# Patient Record
Sex: FEMALE | Race: WHITE | URBAN - METROPOLITAN AREA
[De-identification: names, ages, dates, MRNs, and addresses within clinical notes are randomized per-mention and may not be internally consistent; named-entity substitution may affect disease eponyms.]

---

## 2020-10-06 ENCOUNTER — OFFICE VISIT (OUTPATIENT)
Dept: ORTHOPEDIC SURGERY | Age: 55
End: 2020-10-06
Payer: COMMERCIAL

## 2020-10-06 VITALS — WEIGHT: 215 LBS | HEIGHT: 63 IN | BODY MASS INDEX: 38.09 KG/M2

## 2020-10-06 PROCEDURE — 99243 OFF/OP CNSLTJ NEW/EST LOW 30: CPT | Performed by: ORTHOPAEDIC SURGERY

## 2020-10-06 RX ORDER — MELOXICAM 7.5 MG/1
15 TABLET ORAL DAILY
Qty: 30 TABLET | Refills: 3 | Status: SHIPPED | OUTPATIENT
Start: 2020-10-06

## 2020-10-07 NOTE — PROGRESS NOTES
KNEE VISIT      HISTORY OF PRESENT ILLNESS    Catracho Callejas is a 63 y.o. female who presents for consultation at request of Dr. Lakeisha Gillespie, for bilateral left worse than right knee pain that she has had for the last 2 months or so. She says she is complained of constant pain graded 5/10 that is accompanied by stiffness swelling and instability. Stairs and long periods of being on her feet moving around way seems aggravated. Rest seem to help at times. She did have physical therapy formally as well as medication and a cortisone injection into the left knee which did not help. Despite that she persistent having pain. She denies any history of injury. ROS    Well-documented patient history form dated 10/6/2020  All other ROS negative except for above. Past Surgical history    History reviewed. No pertinent surgical history. PAST MEDICAL    History reviewed. No pertinent past medical history. Allergies    Allergies not on file    Meds    Current Outpatient Medications   Medication Sig Dispense Refill    meloxicam (MOBIC) 7.5 MG tablet Take 2 tablets by mouth daily 30 tablet 3     No current facility-administered medications for this visit.         Social    Social History     Socioeconomic History    Marital status: Single     Spouse name: Not on file    Number of children: Not on file    Years of education: Not on file    Highest education level: Not on file   Occupational History    Not on file   Social Needs    Financial resource strain: Not on file    Food insecurity     Worry: Not on file     Inability: Not on file    Transportation needs     Medical: Not on file     Non-medical: Not on file   Tobacco Use    Smoking status: Never Smoker    Smokeless tobacco: Never Used   Substance and Sexual Activity    Alcohol use: Not on file    Drug use: Not on file    Sexual activity: Not on file   Lifestyle    Physical activity     Days per week: Not on file     Minutes per session: Not on file    Stress: Not on file   Relationships    Social connections     Talks on phone: Not on file     Gets together: Not on file     Attends Hoah[employer]ism service: Not on file     Active member of club or organization: Not on file     Attends meetings of clubs or organizations: Not on file     Relationship status: Not on file    Intimate partner violence     Fear of current or ex partner: Not on file     Emotionally abused: Not on file     Physically abused: Not on file     Forced sexual activity: Not on file   Other Topics Concern    Not on file   Social History Narrative    Not on file       Family HISTORY    History reviewed. No pertinent family history. PHYSICAL EXAM    Vital Signs:  Ht 5' 3\" (1.6 m)   Wt 215 lb (97.5 kg)   BMI 38.09 kg/m²   General Appearance:  Normal body habitus. Alert and oriented to person, place, and time. Affect:  Normal.   Gait: Slightly antalgic. Good balance and coordination. Skin:  Intact. Sensation:  Intact. Strength:  Intact. Reflexes:  Intact. Pulses:  Intact. Knee Exam:    Effusion: Trace in the left knee negative in the right knee    Range of Motion Right Left   Extension 0 0   Flexion 115 115     Provocative Test Right Left    Positive Negative Positive Negative   Anterior drawer [] [x] [] [x]   Lachman [] [x] [] [x]   Posterior drawer [] [x] [] [x]   Varus testing [] [x] [] [x]   Valgus testing [] [x] [x] []   Joint line tenderness [x] [] [x] []     Additional Exam Comments: Her neurocirculatory lymphatic exam otherwise is normal symmetric both lower extremities. She does have medial compartment pain to valgus stress Maury's maneuver and direct palpation along medial joint line. She has no palpable spurs but does have a synovial thickening and patellofemoral crepitus which is in both knees to a degree. She also has some pain medially in the right knee also.       IMAGING STUDIES    X-rays 2 views of both knees demonstrate some moderate arthritis in the left knee with good maintenance of joint space. IMPRESSION    Bilateral knee pain secondary to degenerative medial meniscus tears and osteoarthritis left worse than right, symptomatically    PLAN      1. Conservative care options including physical therapy, NSAIDs, bracing, and activity modification were discussed. 2.  The indications for therapeutic injections were discussed. 3.  The indications for additional imaging studies were discussed. 4.  After considering the various options discussed, the patient elected to pursue a course that includes proceeding with an MRI of the left knee since that is her more symptomatic knee which is failed to improve with formal physical therapy that she still is going through, anti-inflammatory medication, and a cortisone injection. Despite this conservative treatment her pain persists. She should return after testing.